# Patient Record
Sex: MALE | Race: WHITE | NOT HISPANIC OR LATINO | URBAN - METROPOLITAN AREA
[De-identification: names, ages, dates, MRNs, and addresses within clinical notes are randomized per-mention and may not be internally consistent; named-entity substitution may affect disease eponyms.]

---

## 2024-02-08 ENCOUNTER — EMERGENCY (EMERGENCY)
Facility: HOSPITAL | Age: 39
LOS: 1 days | Discharge: HOME | End: 2024-02-08
Attending: STUDENT IN AN ORGANIZED HEALTH CARE EDUCATION/TRAINING PROGRAM
Payer: MEDICAID

## 2024-02-08 VITALS
OXYGEN SATURATION: 97 % | SYSTOLIC BLOOD PRESSURE: 86 MMHG | DIASTOLIC BLOOD PRESSURE: 47 MMHG | TEMPERATURE: 97 F | RESPIRATION RATE: 18 BRPM | HEART RATE: 70 BPM

## 2024-02-08 VITALS
SYSTOLIC BLOOD PRESSURE: 90 MMHG | RESPIRATION RATE: 16 BRPM | HEART RATE: 70 BPM | TEMPERATURE: 97 F | OXYGEN SATURATION: 95 % | DIASTOLIC BLOOD PRESSURE: 58 MMHG

## 2024-02-08 PROCEDURE — 99283 EMERGENCY DEPT VISIT LOW MDM: CPT

## 2024-02-08 PROCEDURE — T1013: CPT

## 2024-02-08 PROCEDURE — 99053 MED SERV 10PM-8AM 24 HR FAC: CPT

## 2024-02-08 PROCEDURE — 82962 GLUCOSE BLOOD TEST: CPT

## 2024-02-08 PROCEDURE — 99285 EMERGENCY DEPT VISIT HI MDM: CPT

## 2024-02-08 NOTE — ED ADULT NURSE NOTE - NSFALLHARMRISKINTERV_ED_ALL_ED

## 2024-02-08 NOTE — ED ADULT TRIAGE NOTE - CHIEF COMPLAINT QUOTE
patient non verbal, found outside [employer], calm, cooperative, admitted to ems that he was drinking alcohol, no obvoius trauma, belonging wanded and secured for weapons by rn

## 2024-02-08 NOTE — ED PROVIDER NOTE - CLINICAL SUMMARY MEDICAL DECISION MAKING FREE TEXT BOX
44y M BIBMES for alcohol intoxication. No signs of trauma, hemodynamically stable. Will monitor for clinical sobriety.

## 2024-02-08 NOTE — ED PROVIDER NOTE - OBJECTIVE STATEMENT
46y M w/ presents for intoxication. Pt BIBEMS after being found outside [facility]; admitted to EMS that he had been drinking. Has no complaints.

## 2024-02-08 NOTE — ED PROVIDER NOTE - PATIENT PORTAL LINK FT
You can access the FollowMyHealth Patient Portal offered by [facility] by registering at the following website: [email or web address]. By joining [facility]’s FollowMyHealth portal, you will also be able to view your health information using other applications (apps) compatible with our system.

## 2024-02-08 NOTE — ED PROVIDER NOTE - NSFOLLOWUPINSTRUCTIONS_ED_ALL_ED_FT
Intoxicación alcohólica  Alcohol Intoxication  La intoxicación alcohólica ocurre cuando kb persona ya no piensa con claridad ni se desempeña malcolm después de consumir bebidas alcohólicas. Sauget también se denomina deterioro. La intoxicación alcohólica puede ocurrir tan solo con kb copa. El definición legal de la intoxicación alcohólica depende de la cantidad de alcohol en la ruth (concentración de alcohol en la ruth, JEREMI). Kb JEREMI de 80 a 100 mg/dl o mayor se considera legalmente julissa kb intoxicación alcohólica. El nivel de deterioro depende de lo siguiente:  La cantidad de alcohol que la persona bebió.  La edad, el sexo y el peso de la persona.  La frecuencia con la que la persona serafin.  Si la persona tiene otras enfermedades, tales julissa diabetes, convulsiones o kb afección cardíaca.  La intoxicación alcohólica puede variar de leve a grave. La afección puede ser peligrosa, especialmente si la persona:  También usa ciertas drogas ilegales y medicamentos recetados.  Seraifn kb gran cantidad de alcohol en un periodo corto de tiempo (consume alcohol compulsivamente).  En las mujeres, el consumo de alcohol compulsivo significa beber cuatro o más medidas de alcohol a la vez.  En los hombres, el consumo de alcohol compulsivo significa beber megan o más medidas de alcohol a la vez.  Si usted o alguien a antonio alrededor parece estar intoxicado, diga algo y actúe.    ¿Cuáles son las causas?  La causa de esta afección es el consumo de alcohol.    ¿Qué incrementa el riesgo?  Los siguientes factores pueden hacer que sea más propenso a contraer esta afección:  Presión de los pares en los adultos jóvenes.  Dificultad para manejar el estrés.  Antecedentes de consumo excesivo de drogas o alcohol.  Antecedentes familiares de consumo excesivo de alcohol o drogas.  Combinación de alcohol con drogas.  Peso corporal bajo.  Consumo de alcohol compulsivo.  ¿Cuáles son los signos o síntomas?  Los síntomas de la intoxicación alcohólica pueden variar de kb persona a otra. Los síntomas pueden ser leves, moderados o graves.    Los síntomas de kb intoxicación alcohólica leve pueden incluir los siguientes:  Sensación de relajación o somnolencia.  Tener kb leve dificultad con la coordinación, el habla, la memoria o la atención.  Los síntomas de kb intoxicación alcohólica moderada pueden incluir los siguientes:  Josie jenna o tristeza intensa.  Tener dificultad con la coordinación, el habla, la memoria o la atención.  Los síntomas de kb intoxicación alcohólica grave pueden incluir los siguientes:  Tener kb seria dificultad con la coordinación, el habla, la memoria o la atención.  Desmayo.  Vómitos.  Confusión.  Respiración lenta.  Coma.  La intoxicación alcohólica puede cambiar rápidamente de leve a grave. Puede causar coma o la muerte, especialmente en las personas que no beben alcohol con frecuencia.    ¿Cómo se diagnostica?  El médico le preguntará la cantidad y el tipo de bebida alcohólica que bebió. La intoxicación también puede diagnosticarse en función de:  Los síntomas y los antecedentes médicos.  Un examen físico.  Un análisis de ruth que mide la JEREMI.  El olor a alcohol en el aliento.  ¿Cómo se trata?  El tratamiento para la intoxicación por alcohol puede incluir:  Ser controlado en un departamento de emergencias, hospital o centro de tratamiento hasta que la JEREMI disminuya y sea seguro para usted regresar a antonio casa.  Líquidos intravenosos (i.v.) para prevenir o tratar la pérdida de líquido en el cuerpo (deshidratación).  Medicamentos para tratar las náuseas o los vómitos, o para eliminar el alcohol del cuerpo.  Asesoramiento sobre los peligros de consumir alcohol.  Tratamiento para el trastorno por el consumo de sustancias.  Oxigenoterapia o kb máquina para respirar (respirador).  Beber alcohol paola mucho tiempo puede tener efectos a doug plazo en el cerebro, el corazón y el aparato digestivo. Estos efectos pueden ser graves y pueden requerir tratamiento.    Siga estas instrucciones en antonio casa:  A sign showing that a person should not drive.  Comida y bebida    A 12-ounce bottle of beer, a 5-ounce glass of wine, and a 1.5-ounce shot of hard liquor.  No ag alcohol si:  Antonio médico le indica no hacerlo.  Está embarazada, puede estar embarazada o está tratando de quedar embarazada.  No tiene la edad legal para beber, o es deon de 21 años de edad en los [place]  Está tomando medicamentos que no se deben mary con alcohol.  Tiene kb afección médica y el alcohol la empeora.  Tiene que conducir o realizar actividades que requieren que esté alerta.  Tiene un trastorno por abuso de sustancias.  Pregúntele al médico si el alcohol es seguro para usted. Si el médico le permite beber alcohol, limite la cantidad que rachelle a lo siguiente:  De 0 a 1 medida por día para las mujeres que no están embarazadas.  De 0 a 2 medidas por día para los hombres.  Sepa cuánta cantidad de alcohol hay en las bebidas que rachelle. En los [place], kb medida equivale a kb botella de cerveza de 12 oz (355 ml), un vaso de vino de 5 oz (148 ml) o un vaso de kb bebida alcohólica de hira graduación de 1½ oz (44 ml).  Evite beber alcohol con el estómago vacío.  El alcohol aumenta la micción. Es importante mantenerse hidratado y evitar la cafeína.  Evite consumir más de kb bebida alcohólica por hora.  Cuando ag más que kb medida de alcohol, ag agua o kb bebida sin alcohol entre las bebidas alcohólicas.  Instrucciones generales    Use los medicamentos de venta cee y los recetados solamente julissa se lo haya indicado el médico.  No conduzca después de beber cualquier cantidad de alcohol. Organice un conductor designado u otra forma de volver a casa.  Pídale a alguna persona responsable que se quede con usted mientras está embriagado. Nodebería quedarse solo.  Comuníquese con un médico si:  No mejora luego de algunos días.  Tiene problemas en el trabajo, la escuela o el hogar debido al consumo de alcohol.  Solicite ayuda de inmediato si:  Tiene alguno de los siguientes síntomas:  Dificultad para mantenerse despierto.  Grave dificultad con la coordinación, el habla, la memoria o la atención.  Le arndt dicho que pudo shanon tenido kb convulsión.  Vómitos con ruth de color wolf brillante o kb sustancia parecida a la borra del café.  Tiene ruth en la materia fecal (heces). La ruth puede hacer que las heces tengan color wolf brillante, color daniel o aspecto alquitranado.  Estos síntomas pueden indicar kb emergencia. Solicite ayuda de inmediato. Llame al 911.  No espere a mayda si los síntomas desaparecen.  No conduzca por titus propios medios hasta el hospital.  También solicite ayuda de inmediato si:  Tiene pensamientos acerca de lastimarse o lastimar a otras personas.  Siga alguno de estos pasos si siente que puede lastimarse o lastimar a otras personas, o tiene pensamientos de poner fin a antonio timo:  Llame al 911.  Llame a National Suicide Prevention Lifeline ([employer] para la Prevención del Suicidio) al 1-108.249.8004 o al 988. Está disponible las 24 horas del día.  Envíe un mensaje de texto a la línea para casos de crisis al [id number].  Resumen  La intoxicación alcohólica ocurre cuando kb persona ya no piensa con claridad ni se desempeña malcolm después de consumir bebidas alcohólicas.  Pregúntele al médico si el alcohol es seguro para usted. Si el médico le permite beber alcohol, limite la cantidad que rachelle.  Comuníquese con el médico si el consumo de alcohol le ha causado problemas en el trabajo, en la escuela o en antonio casa.  Busque ayuda de inmediato si tiene pensamientos acerca de lastimarse a usted mismo o a otras personas.  Esta información no tiene julissa fin reemplazar el consejo del médico. Asegúrese de hacerle al médico cualquier pregunta que tenga.

## 2024-02-08 NOTE — ED PROVIDER NOTE - PHYSICAL EXAMINATION
Constitutional: Awake, alert, in no acute distress, seems intoxicated  Eyes: no scleral icterus  HENT: normocephalic, atraumatic, moist oral mucosa  Neck: supple  CV: RRR, no murmur  Pulm: non-labored respirations, CTAB  Abdomen: soft, non-tender, non-distended  Extremities: no edema, no deformity  Skin: no rash, no jaundice  Neuro: moving all extremities equally